# Patient Record
Sex: MALE | Race: WHITE | NOT HISPANIC OR LATINO | URBAN - METROPOLITAN AREA
[De-identification: names, ages, dates, MRNs, and addresses within clinical notes are randomized per-mention and may not be internally consistent; named-entity substitution may affect disease eponyms.]

---

## 2024-01-23 ENCOUNTER — OFFICE VISIT (OUTPATIENT)
Dept: PRIMARY CARE | Facility: CLINIC | Age: 29
End: 2024-01-23
Payer: COMMERCIAL

## 2024-01-23 VITALS
OXYGEN SATURATION: 98 % | BODY MASS INDEX: 35.75 KG/M2 | SYSTOLIC BLOOD PRESSURE: 130 MMHG | DIASTOLIC BLOOD PRESSURE: 88 MMHG | HEIGHT: 67 IN | HEART RATE: 114 BPM | TEMPERATURE: 97.3 F | WEIGHT: 227.8 LBS

## 2024-01-23 DIAGNOSIS — K21.9 GASTROESOPHAGEAL REFLUX DISEASE WITHOUT ESOPHAGITIS: ICD-10-CM

## 2024-01-23 DIAGNOSIS — J30.1 SEASONAL ALLERGIC RHINITIS DUE TO POLLEN: Primary | ICD-10-CM

## 2024-01-23 PROCEDURE — 1036F TOBACCO NON-USER: CPT | Performed by: FAMILY MEDICINE

## 2024-01-23 PROCEDURE — 96372 THER/PROPH/DIAG INJ SC/IM: CPT | Performed by: FAMILY MEDICINE

## 2024-01-23 PROCEDURE — 99203 OFFICE O/P NEW LOW 30 MIN: CPT | Performed by: FAMILY MEDICINE

## 2024-01-23 RX ORDER — METHYLPREDNISOLONE ACETATE 80 MG/ML
80 INJECTION, SUSPENSION INTRA-ARTICULAR; INTRALESIONAL; INTRAMUSCULAR; SOFT TISSUE ONCE
Status: COMPLETED | OUTPATIENT
Start: 2024-01-23 | End: 2024-01-23

## 2024-01-23 RX ADMIN — METHYLPREDNISOLONE ACETATE 80 MG: 80 INJECTION, SUSPENSION INTRA-ARTICULAR; INTRALESIONAL; INTRAMUSCULAR; SOFT TISSUE at 16:06

## 2024-01-23 ASSESSMENT — PATIENT HEALTH QUESTIONNAIRE - PHQ9
1. LITTLE INTEREST OR PLEASURE IN DOING THINGS: NOT AT ALL
SUM OF ALL RESPONSES TO PHQ9 QUESTIONS 1 & 2: 0
2. FEELING DOWN, DEPRESSED OR HOPELESS: NOT AT ALL

## 2024-01-23 ASSESSMENT — LIFESTYLE VARIABLES
HOW OFTEN DO YOU HAVE A DRINK CONTAINING ALCOHOL: NEVER
HOW OFTEN DO YOU HAVE SIX OR MORE DRINKS ON ONE OCCASION: NEVER

## 2024-01-23 ASSESSMENT — PAIN SCALES - GENERAL: PAINLEVEL: 2

## 2024-01-23 NOTE — PROGRESS NOTES
Subjective   Patient ID: Luis A Camacho is a 28 y.o. male who presents for Abdominal Pain (Patient is here for stomach issues and sinus issues. ).  HPI  28-year-old male presents to [facility] with new physician.  Needs a cortisone shot for allergies.  Also having acid reflux.  Review of Systems  Constitutional: no chills, no fever and no night sweats.   Eyes: no blurred vision and no eyesight problems.   ENT: the ears feel full, nasal congestion, postnasal drip and sinus pressure, but no tinnitus, no vertigo and no sore throat.   Cardiovascular: no chest pain, no intermittent leg claudication, no lower extremity edema, no palpitations and no syncope.   Respiratory: productive cough, no shortness of breath during exertion, no shortness of breath at rest and no wheezing.   Musculoskeletal: no arthralgias and no myalgias.   Neurological: no headache.   Hematologic/Lymphatic: no recurrent infections and no swollen glands.  Visit Vitals  /88 (BP Location: Left arm, Patient Position: Sitting, BP Cuff Size: Large adult)   Pulse (!) 114   Temp 36.3 °C (97.3 °F) (Temporal)        Objective   Physical Exam  Constitutional: Alert and in no acute distress. Well developed, well nourished.   Head and Face: Palpation of the face and sinuses: Abnormal.  Examination of the Sinuses: right maxillary tenderness, left maxillary tenderness, right frontal tenderness and left frontal tenderness.   Eyes: Pupils were equal in size, round, reactive to light (PERRL) with normal accommodation and extraocular movements intact (EOMI).   Ears, Nose, Mouth, and Throat: Otoscopic examination: Normal. Oropharynx: Abnormal.  Postnasal drainage.   Cardiovascular: Heart rate and rhythm were normal, normal S1 and S2, no gallops, no murmurs and no pericardial rub.   Pulmonary: No respiratory distress. Clear bilateral breath sounds.   Lymphatic: No cervical lymphadenopathy.  Assessment/Plan   Problem List Items Addressed This Visit              ICD-10-CM    Seasonal allergic rhinitis due to pollen - Primary J30.1    Relevant Medications    dexAMETHasone (Decadron) injection 4 mg    methylPREDNISolone acetate (DEPO-Medrol) injection 80 mg    Gastroesophageal reflux disease without esophagitis K21.9

## 2024-07-10 ENCOUNTER — TELEPHONE (OUTPATIENT)
Dept: PRIMARY CARE | Facility: CLINIC | Age: 29
End: 2024-07-10
Payer: COMMERCIAL

## 2024-07-10 DIAGNOSIS — J30.1 SEASONAL ALLERGIC RHINITIS DUE TO POLLEN: Primary | ICD-10-CM

## 2024-07-10 RX ORDER — METHYLPREDNISOLONE ACETATE 80 MG/ML
80 INJECTION, SUSPENSION INTRA-ARTICULAR; INTRALESIONAL; INTRAMUSCULAR; SOFT TISSUE ONCE
Status: COMPLETED | OUTPATIENT
Start: 2024-07-10 | End: 2024-07-11

## 2024-07-11 ENCOUNTER — CLINICAL SUPPORT (OUTPATIENT)
Dept: PRIMARY CARE | Facility: CLINIC | Age: 29
End: 2024-07-11
Payer: COMMERCIAL

## 2024-07-11 PROCEDURE — 96372 THER/PROPH/DIAG INJ SC/IM: CPT | Performed by: FAMILY MEDICINE

## 2024-10-01 ENCOUNTER — TELEPHONE (OUTPATIENT)
Dept: PRIMARY CARE | Facility: CLINIC | Age: 29
End: 2024-10-01

## 2024-10-01 ENCOUNTER — CLINICAL SUPPORT (OUTPATIENT)
Dept: PRIMARY CARE | Facility: CLINIC | Age: 29
End: 2024-10-01
Payer: COMMERCIAL

## 2024-10-01 DIAGNOSIS — J30.1 SEASONAL ALLERGIC RHINITIS DUE TO POLLEN: Primary | ICD-10-CM

## 2024-10-01 PROCEDURE — 96372 THER/PROPH/DIAG INJ SC/IM: CPT | Performed by: FAMILY MEDICINE

## 2024-10-01 RX ORDER — METHYLPREDNISOLONE ACETATE 80 MG/ML
80 INJECTION, SUSPENSION INTRA-ARTICULAR; INTRALESIONAL; INTRAMUSCULAR; SOFT TISSUE ONCE
Status: COMPLETED | OUTPATIENT
Start: 2024-10-01 | End: 2024-10-01

## 2024-11-18 ENCOUNTER — APPOINTMENT (OUTPATIENT)
Dept: RADIOLOGY | Facility: HOSPITAL | Age: 29
End: 2024-11-18
Payer: COMMERCIAL

## 2024-11-18 ENCOUNTER — HOSPITAL ENCOUNTER (EMERGENCY)
Facility: HOSPITAL | Age: 29
Discharge: HOME | End: 2024-11-18
Attending: STUDENT IN AN ORGANIZED HEALTH CARE EDUCATION/TRAINING PROGRAM
Payer: COMMERCIAL

## 2024-11-18 ENCOUNTER — APPOINTMENT (OUTPATIENT)
Dept: CARDIOLOGY | Facility: HOSPITAL | Age: 29
End: 2024-11-18
Payer: COMMERCIAL

## 2024-11-18 VITALS
RESPIRATION RATE: 20 BRPM | DIASTOLIC BLOOD PRESSURE: 101 MMHG | HEART RATE: 93 BPM | BODY MASS INDEX: 32.96 KG/M2 | SYSTOLIC BLOOD PRESSURE: 155 MMHG | HEIGHT: 67 IN | WEIGHT: 210 LBS | TEMPERATURE: 98.8 F | OXYGEN SATURATION: 97 %

## 2024-11-18 DIAGNOSIS — R00.0 SINUS TACHYCARDIA: Primary | ICD-10-CM

## 2024-11-18 DIAGNOSIS — R07.9 CHEST PAIN, UNSPECIFIED TYPE: ICD-10-CM

## 2024-11-18 LAB
ALBUMIN SERPL BCP-MCNC: 4.7 G/DL (ref 3.4–5)
ALP SERPL-CCNC: 62 U/L (ref 33–120)
ALT SERPL W P-5'-P-CCNC: 111 U/L (ref 10–52)
ANION GAP SERPL CALC-SCNC: 14 MMOL/L (ref 10–20)
AST SERPL W P-5'-P-CCNC: 41 U/L (ref 9–39)
BASOPHILS # BLD AUTO: 0.05 X10*3/UL (ref 0–0.1)
BASOPHILS NFR BLD AUTO: 0.5 %
BILIRUB SERPL-MCNC: 0.5 MG/DL (ref 0–1.2)
BNP SERPL-MCNC: 7 PG/ML (ref 0–99)
BUN SERPL-MCNC: 14 MG/DL (ref 6–23)
CALCIUM SERPL-MCNC: 10 MG/DL (ref 8.6–10.3)
CARDIAC TROPONIN I PNL SERPL HS: 3 NG/L (ref 0–20)
CARDIAC TROPONIN I PNL SERPL HS: 3 NG/L (ref 0–20)
CHLORIDE SERPL-SCNC: 104 MMOL/L (ref 98–107)
CO2 SERPL-SCNC: 24 MMOL/L (ref 21–32)
CREAT SERPL-MCNC: 0.87 MG/DL (ref 0.5–1.3)
D DIMER PPP FEU-MCNC: 254 NG/ML FEU
EGFRCR SERPLBLD CKD-EPI 2021: >90 ML/MIN/1.73M*2
EOSINOPHIL # BLD AUTO: 0.24 X10*3/UL (ref 0–0.7)
EOSINOPHIL NFR BLD AUTO: 2.6 %
ERYTHROCYTE [DISTWIDTH] IN BLOOD BY AUTOMATED COUNT: 13.1 % (ref 11.5–14.5)
GLUCOSE SERPL-MCNC: 102 MG/DL (ref 74–99)
HCT VFR BLD AUTO: 45.6 % (ref 41–52)
HGB BLD-MCNC: 15.6 G/DL (ref 13.5–17.5)
IMM GRANULOCYTES # BLD AUTO: 0.06 X10*3/UL (ref 0–0.7)
IMM GRANULOCYTES NFR BLD AUTO: 0.6 % (ref 0–0.9)
LYMPHOCYTES # BLD AUTO: 1.84 X10*3/UL (ref 1.2–4.8)
LYMPHOCYTES NFR BLD AUTO: 19.6 %
MAGNESIUM SERPL-MCNC: 1.9 MG/DL (ref 1.6–2.4)
MCH RBC QN AUTO: 29.6 PG (ref 26–34)
MCHC RBC AUTO-ENTMCNC: 34.2 G/DL (ref 32–36)
MCV RBC AUTO: 87 FL (ref 80–100)
MONOCYTES # BLD AUTO: 0.95 X10*3/UL (ref 0.1–1)
MONOCYTES NFR BLD AUTO: 10.1 %
NEUTROPHILS # BLD AUTO: 6.23 X10*3/UL (ref 1.2–7.7)
NEUTROPHILS NFR BLD AUTO: 66.6 %
NRBC BLD-RTO: 0 /100 WBCS (ref 0–0)
PLATELET # BLD AUTO: 344 X10*3/UL (ref 150–450)
POTASSIUM SERPL-SCNC: 3.8 MMOL/L (ref 3.5–5.3)
PROT SERPL-MCNC: 7.8 G/DL (ref 6.4–8.2)
RBC # BLD AUTO: 5.27 X10*6/UL (ref 4.5–5.9)
SODIUM SERPL-SCNC: 138 MMOL/L (ref 136–145)
TSH SERPL-ACNC: 1.88 MIU/L (ref 0.44–3.98)
WBC # BLD AUTO: 9.4 X10*3/UL (ref 4.4–11.3)

## 2024-11-18 PROCEDURE — 85025 COMPLETE CBC W/AUTO DIFF WBC: CPT | Performed by: NURSE PRACTITIONER

## 2024-11-18 PROCEDURE — 71046 X-RAY EXAM CHEST 2 VIEWS: CPT | Performed by: RADIOLOGY

## 2024-11-18 PROCEDURE — 36415 COLL VENOUS BLD VENIPUNCTURE: CPT | Performed by: NURSE PRACTITIONER

## 2024-11-18 PROCEDURE — 99283 EMERGENCY DEPT VISIT LOW MDM: CPT | Mod: 25

## 2024-11-18 PROCEDURE — 84443 ASSAY THYROID STIM HORMONE: CPT | Performed by: NURSE PRACTITIONER

## 2024-11-18 PROCEDURE — 71046 X-RAY EXAM CHEST 2 VIEWS: CPT

## 2024-11-18 PROCEDURE — 84484 ASSAY OF TROPONIN QUANT: CPT | Performed by: NURSE PRACTITIONER

## 2024-11-18 PROCEDURE — 2500000004 HC RX 250 GENERAL PHARMACY W/ HCPCS (ALT 636 FOR OP/ED): Performed by: STUDENT IN AN ORGANIZED HEALTH CARE EDUCATION/TRAINING PROGRAM

## 2024-11-18 PROCEDURE — 83735 ASSAY OF MAGNESIUM: CPT | Performed by: NURSE PRACTITIONER

## 2024-11-18 PROCEDURE — 84075 ASSAY ALKALINE PHOSPHATASE: CPT | Performed by: NURSE PRACTITIONER

## 2024-11-18 PROCEDURE — 96360 HYDRATION IV INFUSION INIT: CPT

## 2024-11-18 PROCEDURE — 83880 ASSAY OF NATRIURETIC PEPTIDE: CPT | Performed by: NURSE PRACTITIONER

## 2024-11-18 PROCEDURE — 85379 FIBRIN DEGRADATION QUANT: CPT | Performed by: NURSE PRACTITIONER

## 2024-11-18 PROCEDURE — 93005 ELECTROCARDIOGRAM TRACING: CPT

## 2024-11-18 RX ORDER — LORAZEPAM 2 MG/ML
1 INJECTION INTRAMUSCULAR ONCE
Status: DISCONTINUED | OUTPATIENT
Start: 2024-11-18 | End: 2024-11-18 | Stop reason: HOSPADM

## 2024-11-18 ASSESSMENT — PAIN DESCRIPTION - ORIENTATION: ORIENTATION: LEFT

## 2024-11-18 ASSESSMENT — COLUMBIA-SUICIDE SEVERITY RATING SCALE - C-SSRS
1. IN THE PAST MONTH, HAVE YOU WISHED YOU WERE DEAD OR WISHED YOU COULD GO TO SLEEP AND NOT WAKE UP?: NO
6. HAVE YOU EVER DONE ANYTHING, STARTED TO DO ANYTHING, OR PREPARED TO DO ANYTHING TO END YOUR LIFE?: NO
2. HAVE YOU ACTUALLY HAD ANY THOUGHTS OF KILLING YOURSELF?: NO

## 2024-11-18 ASSESSMENT — PAIN SCALES - GENERAL: PAINLEVEL_OUTOF10: 2

## 2024-11-18 ASSESSMENT — PAIN DESCRIPTION - LOCATION: LOCATION: ARM

## 2024-11-18 ASSESSMENT — PAIN DESCRIPTION - PAIN TYPE: TYPE: ACUTE PAIN

## 2024-11-18 ASSESSMENT — PAIN - FUNCTIONAL ASSESSMENT: PAIN_FUNCTIONAL_ASSESSMENT: 0-10

## 2024-11-18 ASSESSMENT — PAIN DESCRIPTION - DESCRIPTORS: DESCRIPTORS: SORE

## 2024-11-18 NOTE — ED TRIAGE NOTES
PT. STATES AWOKE AROUND 1 AM WITH PAIN TO LEFT ARM, STATES HAD SOME TINGLING. PT. STATES HAS BEEN GETTING THE SAME SENSATIONS OFF AND ON THROUGHOUT THE DAY. PT. STATES HAVING SOME SORENESS TO LEFT CHEST ALSO.

## 2024-11-18 NOTE — ED TRIAGE NOTES
Secondary to patient volumes and overcrowding, I performed a brief medical screening exam of the patient in triage, as the patient awaits space in the main ED.    History of Present Illness:  Luis A Camacho presents with   Chief Complaint   Patient presents with    Arm Pain     PT. STATES AWOKE AROUND 1 AM WITH PAIN TO LEFT ARM, STATES HAD SOME TINGLING. PT. STATES HAS BEEN GETTING THE SAME SENSATIONS OFF AND ON THROUGHOUT THE DAY. PT. STATES HAVING SOME SORENESS TO LEFT CHEST ALSO.   Patient also complaining of palpitations.    Physical Exam:  General - In no acute distress  Respiratory - Breathing comfortably  Cardiac -rapid rate, regular rhythm, normal S1, S2, no m/g/r.  Radial and pedal pulses 2+ bilaterally  Neurologic - Moving all extremities  Abdomen -bowel sounds present, no CVAT, nontender    Medical Decision Making:  Patient will require further evaluation in the main ED.    Initial diagnostic tests were ordered from triage.      The patient demonstrates understanding that this initial evaluation is a brief medical screening exam and the expectation is that they await for space in the main ED to be further evaluated.  The patient understands that, if they leave prior to further evaluation in the main ED after this initial evaluation in triage, they are doing so under their own accord knowing that their evaluation/work-up is not yet complete. The patient also understands that any preliminary diagnostic results, including abnormalities, may not be shared with them, if they choose to leave prior to further evaluation in the main ED.

## 2024-11-19 NOTE — DISCHARGE INSTRUCTIONS
You are diagnosed with sinus tachycardia as well as suspected panic attack.  I do recommend that you follow-up with the cardiologist in regards to the tachycardia.  Should you been experiencing new chest pain shortness of breath fevers symptoms concerning to call 911 or return to the nearest emergency department immediately.  Please follow-up with your primary physician in coming days to ensure improvement/resolution of symptoms.

## 2024-11-19 NOTE — ED PROVIDER NOTES
EMERGENCY DEPARTMENT ENCOUNTER      Pt Name: Luis A Camacho  MRN: [id number]  Birthdate 1995  Date of evaluation: 11/18/2024  Provider: Filiberto Wick DO    CHIEF COMPLAINT       Chief Complaint   Patient presents with    Arm Pain     PT. STATES AWOKE AROUND 1 AM WITH PAIN TO LEFT ARM, STATES HAD SOME TINGLING. PT. STATES HAS BEEN GETTING THE SAME SENSATIONS OFF AND ON THROUGHOUT THE DAY. PT. STATES HAVING SOME SORENESS TO LEFT CHEST ALSO.       HISTORY OF PRESENT ILLNESS    Luis A Camacho is a 29 y.o. male who presents to the emergency department with Family member for left chest/arm pain with some associated tingling which had started yesterday evening.  Patient denies any injury.  He notes that he is extremely anxious and does typically have panic attacks.  He states anytime that he thinks about it his heart does race.  He feels that this is likely anxiety related.  Wants to be sure that nothing else is going on.  He denies any recent flulike symptoms cough congestion or associated shortness of breath or sweats.  Currently the chest pain has resolved.  When present describes it as an aching sensation 3 out of 10.  Does not radiate to the back is not ripping or tearing.  No history of AAA.          Nursing Notes were reviewed.    REVIEW OF SYSTEMS     CONSTITUTIONAL: Denies fever, sweats, chills.   NEURO: Endorses tingling.  Denies difficulty walking, numbness, weakness, headache.   HEENT: Denies sore throat, rhinorrhea, changes in vision.   CARDIO: Endorses chest pain.  Denies palpitations.  PULM: Denies shortness of breath, cough.   GI: Denies abdominal pain, nausea, vomiting, diarrhea, constipation, melena, hematochezia.  : Denies painful urination, frequency, hematuria.   MSK: Denies recent trauma.   SKIN: Denies rash, lesions.   ENDOCRINE: Denies unexpected weight-loss.   HEME: Denies bleeding disorder.     PAST MEDICAL HISTORY     Past Medical History:   Diagnosis Date    Other conditions influencing  health status     No significant past medical history       SURGICAL HISTORY       Past Surgical History:   Procedure Laterality Date    NOSE SURGERY  05/11/2016    Nose Surgery       ALLERGIES     Penicillins    FAMILY HISTORY     No family history on file.     SOCIAL HISTORY       Social History     Socioeconomic History    Marital status: Single   Tobacco Use    Smoking status: Every Day     Current packs/day: 0.50     Types: Cigarettes    Smokeless tobacco: Never   Vaping Use    Vaping status: Every Day    Substances: Nicotine   Substance and Sexual Activity    Alcohol use: Yes     Comment: SOCIALLY    Drug use: Never       PHYSICAL EXAM   VS: As documented in the triage note from today's date and EMR flowsheet were reviewed.  Gen: Well developed. No acute distress. Seated in bed. Appears nontoxic.  Alert and oriented person time place.  Skin: Warm. Dry. Intact. No rashes or lesions.  Eyes: Pupils equally round and reactive to light. Clear sclera.  HENT: Atraumatic appearance. Mucosal membranes moist. No oral lesions, uvula midline, airway patent.   CV: Regular rate and regular rhythm. S1, S2. No pedal edema. Warm extremities.  Resp: Nonlabored breathing Clear to auscultation bilaterally. No increased work of breathing.   GI: Soft and nontender. No rebound or guarding. Bowel sounds x4 present.   MSK: Symmetric muscle bulk. No joint swelling in the extremities. Compartments are soft. Neurovascularly intact x4 extremities. Radial pulses +2 equal bilaterally.  Pedal pulses +2 equal bilaterally.  Neuro: Alert. Speech fluent. Moving all extremities. No focal deficits. Gait normal.  Psych: Appropriate. Kempt.    DIAGNOSTIC RESULTS   RADIOLOGY:   Non-plain film images such as CT, Ultrasound and MRI are read by the radiologist. Plain radiographic images are visualized and preliminarily interpreted by the emergency physician with the below findings: Chest x-ray is clear no evidence of widened mediastinum or  cardiomegaly.      Interpretation per the Radiologist below, if available at the time of this note:    XR chest 2 views   Final Result   No acute cardiopulmonary abnormality.        Signed by: Gab Daugherty 11/18/2024 5:34 PM   Dictation workstation:   [id number]            ED BEDSIDE ULTRASOUND:   Performed by ED Physician - none    LABS:  Labs Reviewed   COMPREHENSIVE METABOLIC PANEL - Abnormal       Result Value    Glucose 102 (*)     Sodium 138      Potassium 3.8      Chloride 104      Bicarbonate 24      Anion Gap 14      Urea Nitrogen 14      Creatinine 0.87      eGFR >90      Calcium 10.0      Albumin 4.7      Alkaline Phosphatase 62      Total Protein 7.8      AST 41 (*)     Bilirubin, Total 0.5       (*)    B-TYPE NATRIURETIC PEPTIDE - Normal    BNP 7      Narrative:        <100 pg/mL - Heart failure unlikely  100-299 pg/mL - Intermediate probability of acute heart                  failure exacerbation. Correlate with clinical                  context and patient history.    >=300 pg/mL - Heart Failure likely. Correlate with clinical                  context and patient history.    BNP testing is performed using different testing methodology at [facility] than at other [facility]. Direct result comparisons should only be made within the same method.      MAGNESIUM - Normal    Magnesium 1.90     D-DIMER, VTE EXCLUSION - Normal    D-Dimer, Quantitative VTE Exclusion 254      Narrative:     The VTE Exclusion D-Dimer assay is reported in ng/mL Fibrinogen Equivalent Units (FEU).    Per 's instructions for use, a value of less than 500 ng/mL (FEU) may help to exclude DVT or PE in outpatients when the assay is used with a clinical pretest probability assessment.(AEMR must utilize and document eCalc 'Wells Score Deep Vein Thrombosis Risk' for DVT exclusion only. Emergency Department should utilize  Guidelines for Emergency Department Use of the VTE Exclusion D-Dimer and  Clinical Pretest probability assessment model for DVT or PE exclusion.)   SERIAL TROPONIN-INITIAL - Normal    Troponin I, High Sensitivity 3      Narrative:     Less than 99th percentile of normal range cutoff-  Female and children under 18 years old <14 ng/L; Male <21 ng/L: Negative  Repeat testing should be performed if clinically indicated.     Female and children under 18 years old 14-50 ng/L; Male 21-50 ng/L:  Consistent with possible cardiac damage and possible increased clinical   risk. Serial measurements may help to assess extent of myocardial damage.     >50 ng/L: Consistent with cardiac damage, increased clinical risk and  myocardial infarction. Serial measurements may help assess extent of   myocardial damage.      NOTE: Children less than 1 year old may have higher baseline troponin   levels and results should be interpreted in conjunction with the overall   clinical context.     NOTE: Troponin I testing is performed using a different   testing methodology at [facility] than at other   [facility]. Direct result comparisons should only   be made within the same method.   TSH WITH REFLEX TO FREE T4 IF ABNORMAL - Normal    Thyroid Stimulating Hormone 1.88      Narrative:     TSH testing is performed using different testing methodology at [facility] than at other [facility]. Direct result comparisons should only be made within the same method.     SERIAL TROPONIN, 1 HOUR - Normal    Troponin I, High Sensitivity 3      Narrative:     Less than 99th percentile of normal range cutoff-  Female and children under 18 years old <14 ng/L; Male <21 ng/L: Negative  Repeat testing should be performed if clinically indicated.     Female and children under 18 years old 14-50 ng/L; Male 21-50 ng/L:  Consistent with possible cardiac damage and possible increased clinical   risk. Serial measurements may help to assess extent of myocardial damage.     >50 ng/L: Consistent with cardiac  damage, increased clinical risk and  myocardial infarction. Serial measurements may help assess extent of   myocardial damage.      NOTE: Children less than 1 year old may have higher baseline troponin   levels and results should be interpreted in conjunction with the overall   clinical context.     NOTE: Troponin I testing is performed using a different   testing methodology at [facility] than at other   [facility] hospitals. Direct result comparisons should only   be made within the same method.   CBC WITH AUTO DIFFERENTIAL    WBC 9.4      nRBC 0.0      RBC 5.27      Hemoglobin 15.6      Hematocrit 45.6      MCV 87      MCH 29.6      MCHC 34.2      RDW 13.1      Platelets 344      Neutrophils % 66.6      Immature Granulocytes %, Automated 0.6      Lymphocytes % 19.6      Monocytes % 10.1      Eosinophils % 2.6      Basophils % 0.5      Neutrophils Absolute 6.23      Immature Granulocytes Absolute, Automated 0.06      Lymphocytes Absolute 1.84      Monocytes Absolute 0.95      Eosinophils Absolute 0.24      Basophils Absolute 0.05     TROPONIN SERIES- (INITIAL, 1 HR)    Narrative:     The following orders were created for panel order Troponin I Series, High Sensitivity (0, 1 HR).  Procedure                               Abnormality         Status                     ---------                               -----------         ------                     Troponin I, High Sensitiv...[[id number]]  Normal              Final result               Troponin, High Sensitivit...[[id number]]  Normal              Final result                 Please view results for these tests on the individual orders.       All other labs were within normal range or not returned as of this dictation.    EMERGENCY DEPARTMENT COURSE/MDM:   Vitals:    Vitals:    11/18/24 2045 11/18/24 2100 11/18/24 2115 11/18/24 2120   BP:  (!) 186/85  (!) 155/101   BP Location:       Patient Position:       Pulse: 97 (!) 115 93    Resp: (!) 22 (!) 21 20     Temp:       TempSrc:       SpO2: 95% 98% 97%    Weight:       Height:           I reviewed the patient's triage vitals and they are hypertensive recommended follow-up with primary physician for repeat checks.  Patient initially arrives tachycardic this did downtrend significantly without intervention.    Due to the above findings the following was ordered cardiac workup to include D-dimer.    Patient reports that that he feels this is anxiety related he was offered Ativan although declined as he states that he needs to be able to drive home.  Fluid bolus was given.    Workup was pursued cardiac troponin x 2 within normal range no acute injury pattern on EKG sinus tachycardia alone.  D-dimer within normal range making PE less likely.  LFTs chronically elevated no right upper quadrant tenderness recommended close outpatient follow-up.  Renal function is appropriate no significant electrolyte derangements TSH within normal range.  No signs of anemia.  Chest x-ray is clear.  Patient was reevaluated after fluid bolus tachycardia has resolved.  He is recommended close follow-up with cardiology as well as primary physician and strict return precautions could be related to anxiety/panic although unclear at this time heart score is low.  No runs of dysrhythmias at this time.  Patient is appreciative care agreeable with plan discharged in stable condition.    HEART Score:    History:   [x  ] Slightly suspicious - 0   [  ] Moderately suspicious - 1  [  ] Highly suspicious - 2     EKG:   [ x ] Normal - 0    [  ] Non-specific repolarization disturbance (No ST changes, but LBBB, LVH, repolarization changes)  - 1   [  ] Significant ST deviation (ST changes not d/t LBBB, LVH, digoxin)  - 2     Age:   [ x ] < 45 - 0   [  ] 45-64 - 1   [  ] > 65 - 2     Risk Factors:     HTN, HLD, DM, obesity (BMI > 30), smoking (current or w/I 3mo), + fmx (before age of 65), CAD, prior MI, PCI/CABG, CVA/TIA, PAD  [  ] No known risk factors - 0   [  x ] 1-2 risk factors - 1    [  ] >3 risk factors or hx of atherosclerotic disease - 2     Initial troponin:  [x  ] < normal limit - 0   [  ] 1 - 3x normal limit - 1   [  ] > 3x normal limit - 2    Total:   [x ] 0-3 Points: 1.7% risk of major adverse cardiac event in 6 weeks  [ ] 4-6 Points: 12-16.6% risk of major adverse cardiac event in 6 weeks  [ ] 7-10 Points 50-65% risk of major adverse cardiac event in 6 weeks    I did discuss the heart score results with the patient.  We did discuss the risk stratification. I did discuss that the patient's risk based on the above scoring and their risk of coronary artery disease. The patient is comfortable being discharged home and following up with the appropriate physicians. This is shared decision making. They're to return to the nearest emergency room for any new or worsening symptoms.    ED Course as of 11/18/24 2245 Mon Nov 18, 2024   1910 Interpreted by the Emergency Department Attending: ECG revealed sinus tachycardia at a rate of 128 beats per minute with FL interval 145 , QRS of 79 , QTc of 423.  No acute injury pattern. Previous EKG on March 22 revealed no significant changes.    [MG]      ED Course User Index  [MG] Filiberto Wick DO         Diagnoses as of 11/18/24 2245   Sinus tachycardia   Chest pain, unspecified type       Patient was counseled regarding labs, imaging, likely diagnosis, and plan. All questions were answered.     ------------------------------------------------------------------  Information provided by the patient and family member  Past medical history complicating workup panic attacks, anxiety  Previous medical records reviewed office visit 1/23/2024  Considered CTA although D-dimer within normal range.  Clinically low concern for aortic pathology.  Shared medical decision making patient agreeable with close outpatient follow-up prefers to hold off on Ativan due to  driving.  ------------------------------------------------------------------  ED Medications administered this visit:    Medications   LORazepam (Ativan) injection 1 mg (1 mg intravenous Not Given 11/18/24 1949)   lactated Ringer's bolus 1,000 mL (0 mL intravenous Stopped 11/18/24 2117)       New Prescriptions from this visit:  There are no discharge medications for this patient.      Follow-up:  Delano RAMIREZ DO  [address]  [address]  [facility] [id number] [phone]    Schedule an appointment as soon as possible for a visit in 2 days      [facility]  [address] [facility] [id number] [phone]  Go to   If symptoms worsen    James C Ramicone, DO  [address]  [facility] [id number] [phone]    Schedule an appointment as soon as possible for a visit in 1 day          Final Impression:   1. Sinus tachycardia    2. Chest pain, unspecified type          Filiberto Wick DO    (Please note that portions of this note were completed with a voice recognition program.  Efforts were made to edit the dictations but occasionally words are mis-transcribed.)     Filiberto Wick DO  11/18/24 2258

## 2024-11-22 LAB
ATRIAL RATE: 129 BPM
P AXIS: 51 DEGREES
PR INTERVAL: 145 MS
Q ONSET: 253 MS
QRS COUNT: 21 BEATS
QRS DURATION: 79 MS
QT INTERVAL: 290 MS
QTC CALCULATION(BAZETT): 423 MS
QTC FREDERICIA: 373 MS
R AXIS: -24 DEGREES
T AXIS: 21 DEGREES
T OFFSET: 398 MS
VENTRICULAR RATE: 128 BPM

## 2025-02-11 ENCOUNTER — APPOINTMENT (OUTPATIENT)
Dept: PRIMARY CARE | Facility: CLINIC | Age: 30
End: 2025-02-11
Payer: COMMERCIAL

## 2025-02-11 VITALS
HEART RATE: 109 BPM | BODY MASS INDEX: 34.37 KG/M2 | DIASTOLIC BLOOD PRESSURE: 84 MMHG | OXYGEN SATURATION: 98 % | HEIGHT: 67 IN | WEIGHT: 219 LBS | SYSTOLIC BLOOD PRESSURE: 138 MMHG | TEMPERATURE: 97.3 F

## 2025-02-11 DIAGNOSIS — Z72.89 CURRENT EVERY DAY VAPING: ICD-10-CM

## 2025-02-11 DIAGNOSIS — F41.9 ANXIETY: Primary | ICD-10-CM

## 2025-02-11 PROCEDURE — 3008F BODY MASS INDEX DOCD: CPT | Performed by: STUDENT IN AN ORGANIZED HEALTH CARE EDUCATION/TRAINING PROGRAM

## 2025-02-11 PROCEDURE — 99204 OFFICE O/P NEW MOD 45 MIN: CPT | Performed by: STUDENT IN AN ORGANIZED HEALTH CARE EDUCATION/TRAINING PROGRAM

## 2025-02-11 RX ORDER — SERTRALINE HYDROCHLORIDE 25 MG/1
25 TABLET, FILM COATED ORAL DAILY
Qty: 30 TABLET | Refills: 2 | Status: SHIPPED | OUTPATIENT
Start: 2025-02-11 | End: 2025-05-12

## 2025-02-11 RX ORDER — HYDROXYZINE HYDROCHLORIDE 25 MG/1
25 TABLET, FILM COATED ORAL NIGHTLY PRN
Qty: 90 TABLET | Refills: 1 | Status: SHIPPED | OUTPATIENT
Start: 2025-02-11 | End: 2026-02-11

## 2025-02-11 RX ORDER — HYDROXYZINE HYDROCHLORIDE 25 MG/1
TABLET, FILM COATED ORAL
COMMUNITY
Start: 2025-01-02 | End: 2025-02-11 | Stop reason: SDUPTHER

## 2025-02-11 ASSESSMENT — PAIN SCALES - GENERAL: PAINLEVEL_OUTOF10: 0-NO PAIN

## 2025-02-11 ASSESSMENT — PATIENT HEALTH QUESTIONNAIRE - PHQ9
1. LITTLE INTEREST OR PLEASURE IN DOING THINGS: NOT AT ALL
2. FEELING DOWN, DEPRESSED OR HOPELESS: NOT AT ALL
SUM OF ALL RESPONSES TO PHQ9 QUESTIONS 1 AND 2: 0

## 2025-02-11 ASSESSMENT — ENCOUNTER SYMPTOMS
LOSS OF SENSATION IN FEET: 0
DEPRESSION: 0
OCCASIONAL FEELINGS OF UNSTEADINESS: 0

## 2025-02-11 NOTE — PROGRESS NOTES
Subjective   Patient ID: Luis A Camacho is a 29 y.o. male who presents for New Patient Visit.  HPI    Luis A is new patient to the office. His main complaint today is increasing anxiety. He states anxiety is something he has always dealt with but things have heightened after his mother had heart attack a few months ago. He states after the incident, he was having 5-7 anxiety attacks a day. Things have since improved, but often feels his heart racing and pain in chest and stomach, no nausea or vomiting. He has hydroxyzine he takes nightly to help him sleep. He is interested in trying an anxiety medication today. No depression, No SI/HI.    Patient quit drinking 4-5 months ago, previously had weekend binges. He was a previous cigarette smoker, but quit 9 years ago. He now vapes nicotine. A vape lasts him about a day and a half.     PMHx: anxiety, seasonal allergies  SurgHx: nose reconstruction surgery  FamHx: heart disease (mom had heart attack), maternal uncle and grandfather (heart disease)  SocialHx: Works with [employer]. Eats out a lot due to long work hours. Vapes nicotine, quit cigarettes 9 years ago. No EtOH in 4 months. Lives at home with mother & takes care of her. Not sexually active.     Review of Systems  12-point ROS was reviewed and is negative, unless otherwise noted in HPI    Objective   Vitals:    02/11/25 1057   BP: 138/84   Pulse: 109   Temp: 36.3 °C (97.3 °F)   SpO2: 98%      Physical Exam  GEN: alert, conversant, NAD  HEENT: PERRL, EOMI, MMM, Tms pearly gray bilaterally  NECK: supple, no LAD appreciated  CHEST: CTAB  CV: S1, S2, RRR, no murmurs appreciated  ABD: soft, NT, ND  EXT: no significant LE edema  SKIN: warm, dry    Assessment/Plan   #Anxiety  - Begin sertraline 25 mg tablet daily, side effect profile discussed  - Continue hydroxyzine PRN for insomnia and anxiety   - Follow up in about 1 month to see how things are progressing  - Recommended therapy, not interested at this time    #Smoker,  vaping  - Counseled on the health risks associated with vaping and benefits of quitting   - Pt said he has quit in the past and feels as if he could do it again once anxiety gets under control      Health Maintenance:  Vaccines: COVID (declines), Flu (declines), TDAP (2020)  Labs: Repeat CMP and Lipid panel at next visit     RTC in 1 month, or sooner AVIVA PETERSON MS-3    Trainee role: Medical Student    I saw and evaluated the patient. I personally obtained the key and critical portions of the history and physical exam or was physically present for key and critical portions performed by the trainee. I reviewed the trainee's documentation and discussed the patient with the trainee. I agree with the trainee's medical decision making as documented on the trainee's notes.    Mikhail Jha, DO

## 2025-03-25 ENCOUNTER — APPOINTMENT (OUTPATIENT)
Dept: PRIMARY CARE | Facility: CLINIC | Age: 30
End: 2025-03-25
Payer: COMMERCIAL

## 2025-03-25 VITALS
BODY MASS INDEX: 33.71 KG/M2 | OXYGEN SATURATION: 98 % | DIASTOLIC BLOOD PRESSURE: 80 MMHG | HEART RATE: 121 BPM | SYSTOLIC BLOOD PRESSURE: 132 MMHG | TEMPERATURE: 97.8 F | WEIGHT: 215.2 LBS

## 2025-03-25 DIAGNOSIS — F41.9 ANXIETY: Primary | ICD-10-CM

## 2025-03-25 PROCEDURE — 99213 OFFICE O/P EST LOW 20 MIN: CPT | Performed by: STUDENT IN AN ORGANIZED HEALTH CARE EDUCATION/TRAINING PROGRAM

## 2025-03-25 RX ORDER — SERTRALINE HYDROCHLORIDE 25 MG/1
25 TABLET, FILM COATED ORAL DAILY
Qty: 90 TABLET | Refills: 1 | Status: SHIPPED | OUTPATIENT
Start: 2025-03-25 | End: 2025-09-21

## 2025-03-25 ASSESSMENT — PATIENT HEALTH QUESTIONNAIRE - PHQ9
1. LITTLE INTEREST OR PLEASURE IN DOING THINGS: NOT AT ALL
SUM OF ALL RESPONSES TO PHQ9 QUESTIONS 1 AND 2: 0
2. FEELING DOWN, DEPRESSED OR HOPELESS: NOT AT ALL

## 2025-03-25 ASSESSMENT — ENCOUNTER SYMPTOMS: DEPRESSION: 0

## 2025-03-25 ASSESSMENT — PAIN SCALES - GENERAL: PAINLEVEL_OUTOF10: 0-NO PAIN

## 2025-03-25 NOTE — PROGRESS NOTES
Subjective   Patient ID: Luis A Camacho is a 30 y.o. male who presents for Follow-up.  HPI  Luis A is here for follow up visit.    He has been doing very well since starting sertraline. Does have some nausea after taking it. No other side effects. Feels much improved overall. No panic attacks since starting. No depressive symptoms. He has been helping his mother recover after her heart surgery.     Taking hydoxyzine ~1/2 nights to help with sleep. Sleep is much improved.    Review of Systems  12-point ROS was reviewed and is negative, unless otherwise noted in HPI    Objective   Vitals:    03/25/25 1419   BP: 132/80   Pulse: (!) 121   Temp: 36.6 °C (97.8 °F)   SpO2: 98%      Physical Exam  GEN: alert, conversant, NAD  HEENT: PERRL, EOMI, MMM, Tms pearly gray bilaterally  NECK: supple, no LAD appreciated  CHEST: CTAB  CV: S1, S2, RRR, no murmurs appreciated  ABD: soft, NT, ND  EXT: no significant LE edema  SKIN: warm, dry    Assessment/Plan   #Anxiety  - Continue sertraline 25 mg tablet daily  - Continue hydroxyzine PRN for insomnia and anxiety     #Smoker, vaping  - Counseled on the health risks associated with vaping and benefits of quitting. Vaping nicotine occasionally, cutting down.  - Pt said he has quit in the past and feels as if he could do it again once anxiety gets under control      Health Maintenance:  Vaccines: COVID (declines), Flu (declines), TDAP (2020)  Screening: N/A  Labs: Consider labs at next visit     RTC in 2-3 month, or sooner PRN    Mikhail Jah, DO

## 2025-04-21 ENCOUNTER — HOSPITAL ENCOUNTER (EMERGENCY)
Facility: HOSPITAL | Age: 30
Discharge: HOME | End: 2025-04-22
Attending: STUDENT IN AN ORGANIZED HEALTH CARE EDUCATION/TRAINING PROGRAM
Payer: COMMERCIAL

## 2025-04-21 DIAGNOSIS — S61.213A LACERATION OF LEFT MIDDLE FINGER WITHOUT FOREIGN BODY WITHOUT DAMAGE TO NAIL, INITIAL ENCOUNTER: ICD-10-CM

## 2025-04-21 DIAGNOSIS — S01.81XA FACIAL LACERATION, INITIAL ENCOUNTER: Primary | ICD-10-CM

## 2025-04-21 PROCEDURE — 12041 INTMD RPR N-HF/GENIT 2.5CM/<: CPT

## 2025-04-21 PROCEDURE — 99283 EMERGENCY DEPT VISIT LOW MDM: CPT | Performed by: STUDENT IN AN ORGANIZED HEALTH CARE EDUCATION/TRAINING PROGRAM

## 2025-04-22 VITALS
HEIGHT: 67 IN | HEART RATE: 86 BPM | WEIGHT: 212.5 LBS | BODY MASS INDEX: 33.35 KG/M2 | TEMPERATURE: 97.2 F | OXYGEN SATURATION: 99 % | RESPIRATION RATE: 18 BRPM | SYSTOLIC BLOOD PRESSURE: 166 MMHG | DIASTOLIC BLOOD PRESSURE: 84 MMHG

## 2025-04-22 PROCEDURE — 90471 IMMUNIZATION ADMIN: CPT | Performed by: STUDENT IN AN ORGANIZED HEALTH CARE EDUCATION/TRAINING PROGRAM

## 2025-04-22 PROCEDURE — 2500000005 HC RX 250 GENERAL PHARMACY W/O HCPCS: Performed by: STUDENT IN AN ORGANIZED HEALTH CARE EDUCATION/TRAINING PROGRAM

## 2025-04-22 PROCEDURE — 12053 INTMD RPR FACE/MM 5.1-7.5 CM: CPT | Performed by: NURSE PRACTITIONER

## 2025-04-22 PROCEDURE — 90715 TDAP VACCINE 7 YRS/> IM: CPT | Mod: JZ | Performed by: STUDENT IN AN ORGANIZED HEALTH CARE EDUCATION/TRAINING PROGRAM

## 2025-04-22 PROCEDURE — 2500000004 HC RX 250 GENERAL PHARMACY W/ HCPCS (ALT 636 FOR OP/ED): Mod: JZ | Performed by: STUDENT IN AN ORGANIZED HEALTH CARE EDUCATION/TRAINING PROGRAM

## 2025-04-22 RX ORDER — ACETAMINOPHEN 325 MG/1
650 TABLET ORAL ONCE
Status: DISCONTINUED | OUTPATIENT
Start: 2025-04-22 | End: 2025-04-22 | Stop reason: HOSPADM

## 2025-04-22 RX ORDER — BACITRACIN ZINC 500 UNIT/G
OINTMENT IN PACKET (EA) TOPICAL ONCE
Status: COMPLETED | OUTPATIENT
Start: 2025-04-22 | End: 2025-04-22

## 2025-04-22 RX ADMIN — BACITRACIN ZINC 1 APPLICATION: 500 OINTMENT TOPICAL at 03:32

## 2025-04-22 RX ADMIN — TETANUS TOXOID, REDUCED DIPHTHERIA TOXOID AND ACELLULAR PERTUSSIS VACCINE, ADSORBED 0.5 ML: 5; 2.5; 8; 8; 2.5 SUSPENSION INTRAMUSCULAR at 01:23

## 2025-04-22 ASSESSMENT — LIFESTYLE VARIABLES
HAVE PEOPLE ANNOYED YOU BY CRITICIZING YOUR DRINKING: NO
HAVE YOU EVER FELT YOU SHOULD CUT DOWN ON YOUR DRINKING: NO
TOTAL SCORE: 0
EVER HAD A DRINK FIRST THING IN THE MORNING TO STEADY YOUR NERVES TO GET RID OF A HANGOVER: NO
EVER FELT BAD OR GUILTY ABOUT YOUR DRINKING: NO

## 2025-04-22 ASSESSMENT — COLUMBIA-SUICIDE SEVERITY RATING SCALE - C-SSRS
6. HAVE YOU EVER DONE ANYTHING, STARTED TO DO ANYTHING, OR PREPARED TO DO ANYTHING TO END YOUR LIFE?: NO
2. HAVE YOU ACTUALLY HAD ANY THOUGHTS OF KILLING YOURSELF?: NO
1. IN THE PAST MONTH, HAVE YOU WISHED YOU WERE DEAD OR WISHED YOU COULD GO TO SLEEP AND NOT WAKE UP?: NO

## 2025-04-22 NOTE — DISCHARGE INSTRUCTIONS
As discussed you can apply topical bacitracin up to 3 times daily please keep the wound clean and dry.  No submerging the wound.  Please follow-up with your primary physician in the coming days for wound reevaluation.  Have the sutures removed in 5 to 7 days.  Should you been experiencing signs of infection but not limited to including confusion numbness tingling weakness symptoms concerning to call 911 or return to the nearest emergency department immediately.
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
## 2025-04-22 NOTE — ED PROCEDURE NOTE
Procedure  Laceration Repair    Performed by: SPEEDY Pineda  Authorized by: SPEEDY Pineda    Consent:     Consent obtained:  Verbal    Consent given by:  Patient    Risks, benefits, and alternatives were discussed: yes      Risks discussed:  Infection, need for additional repair, nerve damage, poor wound healing, poor cosmetic result, pain, retained foreign body, tendon damage and vascular damage    Alternatives discussed:  Referral, observation, delayed treatment and no treatment  Universal protocol:     Procedure explained and questions answered to patient or proxy's satisfaction: yes      Relevant documents present and verified: yes      Test results available: yes      Imaging studies available: yes      Required blood products, implants, devices, and special equipment available: yes      Immediately prior to procedure, a time out was called: yes      Patient identity confirmed:  Verbally with patient and arm band  Anesthesia:     Anesthesia method:  Local infiltration    Local anesthetic:  Lidocaine 1% WITH epi  Laceration details:     Location:  Face    Face location:  L eyebrow    Length (cm):  7.5    Depth (mm):  5  Pre-procedure details:     Preparation:  Patient was prepped and draped in usual sterile fashion  Exploration:     Limited defect created (wound extended): no      Imaging outcome: foreign body not noted      Wound exploration: wound explored through full range of motion and entire depth of wound visualized      Wound extent: no fascia violation noted, no foreign bodies/material noted, no muscle damage noted, no nerve damage noted, no tendon damage noted, no underlying fracture noted and no vascular damage noted    Treatment:     Area cleansed with:  Soap and water    Amount of cleaning:  Standard    Debridement:  None    Undermining:  None    Scar revision: no    Skin repair:     Repair method:  Sutures    Suture size:  6-0    Suture material:  Nylon    Suture technique:   Simple interrupted    Number of sutures:  20  Approximation:     Approximation:  Close  Repair type:     Repair type:  Intermediate  Post-procedure details:     Dressing:  Antibiotic ointment    Procedure completion:  Tolerated  Laceration Repair    Performed by: SPEEDY Pineda  Authorized by: SPEEDY Pineda    Consent:     Consent obtained:  Verbal    Consent given by:  Patient    Risks, benefits, and alternatives were discussed: yes      Risks discussed:  Infection, need for additional repair, nerve damage, poor wound healing, poor cosmetic result, pain, retained foreign body, tendon damage and vascular damage    Alternatives discussed:  Referral, delayed treatment, no treatment and observation  Universal protocol:     Procedure explained and questions answered to patient or proxy's satisfaction: yes      Relevant documents present and verified: yes      Test results available: yes      Imaging studies available: yes      Required blood products, implants, devices, and special equipment available: yes      Immediately prior to procedure, a time out was called: yes      Patient identity confirmed:  Verbally with patient and arm band  Anesthesia:     Anesthesia method:  Local infiltration    Local anesthetic:  Lidocaine 1% w/o epi  Laceration details:     Location:  Finger    Finger location:  L long finger    Wound length (cm): 1.5 cm x 2 cm V shaped laceration.    Depth (mm):  2  Pre-procedure details:     Preparation:  Patient was prepped and draped in usual sterile fashion  Exploration:     Limited defect created (wound extended): no      Imaging outcome: foreign body not noted      Wound exploration: wound explored through full range of motion and entire depth of wound visualized      Wound extent: no fascia violation noted, no foreign bodies/material noted, no muscle damage noted, no nerve damage noted, no tendon damage noted, no underlying fracture noted and no vascular damage noted       Contaminated: no    Treatment:     Area cleansed with:  Soap and water    Amount of cleaning:  Standard    Debridement:  None    Undermining:  None    Scar revision: no    Skin repair:     Repair method:  Sutures    Suture size:  4-0    Suture material:  Nylon    Suture technique:  Simple interrupted    Number of sutures: 6 stitches total, 5 interrupted and 1 corner stitch.  Approximation:     Approximation:  Close  Repair type:     Repair type:  Intermediate  Post-procedure details:     Dressing:  Antibiotic ointment and sterile dressing    Procedure completion:  Tolerated               KENNEDY Pineda-MISTY  04/22/25 3079

## 2025-04-28 ENCOUNTER — HOSPITAL ENCOUNTER (EMERGENCY)
Facility: HOSPITAL | Age: 30
Discharge: HOME | End: 2025-04-28
Attending: STUDENT IN AN ORGANIZED HEALTH CARE EDUCATION/TRAINING PROGRAM
Payer: COMMERCIAL

## 2025-04-28 VITALS
RESPIRATION RATE: 16 BRPM | HEART RATE: 99 BPM | DIASTOLIC BLOOD PRESSURE: 78 MMHG | SYSTOLIC BLOOD PRESSURE: 152 MMHG | BODY MASS INDEX: 33.27 KG/M2 | WEIGHT: 212 LBS | TEMPERATURE: 97.3 F | OXYGEN SATURATION: 98 % | HEIGHT: 67 IN

## 2025-04-28 DIAGNOSIS — Z48.02 ENCOUNTER FOR REMOVAL OF SUTURES: Primary | ICD-10-CM

## 2025-04-28 PROCEDURE — 99281 EMR DPT VST MAYX REQ PHY/QHP: CPT | Performed by: STUDENT IN AN ORGANIZED HEALTH CARE EDUCATION/TRAINING PROGRAM

## 2025-04-28 ASSESSMENT — PAIN - FUNCTIONAL ASSESSMENT: PAIN_FUNCTIONAL_ASSESSMENT: 0-10

## 2025-04-28 ASSESSMENT — PAIN SCALES - GENERAL: PAINLEVEL_OUTOF10: 0 - NO PAIN

## 2025-04-28 NOTE — Clinical Note
Luis A Camacho was seen and treated in our emergency department on 4/28/2025.  He may return to work on 04/29/2025.       If you have any questions or concerns, please don't hesitate to call.      Filiberto Wick, DO

## 2025-04-28 NOTE — DISCHARGE INSTRUCTIONS
Patient presents to urgent care or your primary care physician in 3 days for further suture removal.  You can apply bacitracin ointment to the area and cleanse twice daily with antibacterial soap and water.  Recommend silicone ointment or adhesive bandages for scar prevention.  Return to the ED with any new or worsening symptoms including worsening pain, severe swelling, heavy bleeding, or subsequent injury.
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
## 2025-05-02 ENCOUNTER — HOSPITAL ENCOUNTER (EMERGENCY)
Facility: HOSPITAL | Age: 30
Discharge: HOME | End: 2025-05-02
Payer: COMMERCIAL

## 2025-05-02 VITALS
SYSTOLIC BLOOD PRESSURE: 165 MMHG | TEMPERATURE: 96.8 F | BODY MASS INDEX: 32.96 KG/M2 | HEIGHT: 67 IN | RESPIRATION RATE: 16 BRPM | OXYGEN SATURATION: 100 % | DIASTOLIC BLOOD PRESSURE: 84 MMHG | HEART RATE: 90 BPM | WEIGHT: 210 LBS

## 2025-05-02 DIAGNOSIS — Z48.02 VISIT FOR SUTURE REMOVAL: Primary | ICD-10-CM

## 2025-05-02 PROCEDURE — 99281 EMR DPT VST MAYX REQ PHY/QHP: CPT

## 2025-05-02 NOTE — DISCHARGE INSTRUCTIONS
You can use the Steri-Strips to help keep the wound together.  You were also referred to plastics on-call for follow-up appointment if you have any issues with your scar and wound healing.  Return to ER if symptoms change or worsen

## 2025-05-02 NOTE — ED PROVIDER NOTES
"Limitations to History: none  External Records Reviewed  Independent Historians: self  Social determinants affecting care: none    HPI  Luis A Camacho is a 30 y.o. male who presents emergency department for suture removal.  He reports that he was here few days ago for suture removal however no left for sutures and as the wound appeared to be dehiscing.  He has not had any drainage or pain to the area.  He reports that it is a little bit itchy.  He has not had fever or chills.  He has no further complaints    [facility]  Medical History[1] reviewed by myself.    Meds  Current Outpatient Medications   Medication Instructions    hydrOXYzine HCL (ATARAX) 25 mg, oral, Nightly PRN    sertraline (ZOLOFT) 25 mg, oral, Daily       Allergies  RX Allergies[2] reviewed by myself.    SHx  Social History[3] reviewed by myself.      ------------------------------------------------------------------------------------------------------------------------------------------    /84 (BP Location: Right arm, Patient Position: Sitting)   Pulse 90   Temp 36 °C (96.8 °F) (Tympanic)   Resp 16   Ht 1.702 m (5' 7\")   Wt 95.3 kg (210 lb)   SpO2 100%   BMI 32.89 kg/m²     Physical Exam  Vitals and nursing note reviewed.   Constitutional:       Appearance: Normal appearance. He is normal weight.   HENT:      Head: Atraumatic.      Nose: Nose normal.      Mouth/Throat:      Mouth: Mucous membranes are moist.   Eyes:      Extraocular Movements: Extraocular movements intact.      Conjunctiva/sclera: Conjunctivae normal.   Cardiovascular:      Rate and Rhythm: Normal rate.   Pulmonary:      Effort: Pulmonary effort is normal.   Musculoskeletal:         General: Normal range of motion.      Cervical back: Neck supple.   Skin:     General: Skin is warm and dry.      Comments: 4 sutures intact to the left eyelid. No erythema or drainage.    Neurological:      Mental Status: He is alert and oriented to person, place, and time.   Psychiatric:         " Mood and Affect: Mood normal.          ------------------------------------------------------------------------------------------------------------------------------------------  Labs  Labs Reviewed - No data to display     Imaging  No orders to display        ED Course  Diagnoses as of 05/02/25 1110   Visit for suture removal        Medical Decision Making: He did not appear ill or toxic.  Vital signs reviewed.  He is hypertensive but otherwise hemodynamically stable.  4 sutures intact.  These were easily removed. Steri-Strip placed over the wounds.  He was referred to plastics on call if he has any concerns about the scarring or wound healing.  He is to return to ER if his symptoms change or worsen.  He verbalized understanding and agreed to plan of care. He was discharged home in stable condition.    Diagnosis: Encounter for suture removal  Plan: Discharge       [1]   Past Medical History:  Diagnosis Date    Other conditions influencing health status     No significant past medical history   [2]   Allergies  Allergen Reactions    Penicillins Unknown   [3]   Social History  Tobacco Use    Smoking status: Former     Types: Cigarettes    Smokeless tobacco: Never   Vaping Use    Vaping status: Every Day    Substances: Nicotine   Substance Use Topics    Alcohol use: Yes     Comment: SOCIALLY    Drug use: Never        Parminder Verde PA-C  05/02/25 1110

## 2025-07-12 ENCOUNTER — APPOINTMENT (OUTPATIENT)
Dept: CARDIOLOGY | Facility: HOSPITAL | Age: 30
End: 2025-07-12
Payer: COMMERCIAL

## 2025-07-12 ENCOUNTER — HOSPITAL ENCOUNTER (EMERGENCY)
Facility: HOSPITAL | Age: 30
Discharge: HOME | End: 2025-07-12
Attending: STUDENT IN AN ORGANIZED HEALTH CARE EDUCATION/TRAINING PROGRAM
Payer: COMMERCIAL

## 2025-07-12 VITALS
TEMPERATURE: 98.6 F | HEIGHT: 67 IN | BODY MASS INDEX: 33.74 KG/M2 | RESPIRATION RATE: 18 BRPM | OXYGEN SATURATION: 98 % | SYSTOLIC BLOOD PRESSURE: 155 MMHG | HEART RATE: 111 BPM | WEIGHT: 215 LBS | DIASTOLIC BLOOD PRESSURE: 99 MMHG

## 2025-07-12 DIAGNOSIS — T75.4XXA ELECTRICAL SHOCK OF HAND, INITIAL ENCOUNTER: Primary | ICD-10-CM

## 2025-07-12 LAB
ALBUMIN SERPL BCP-MCNC: 4.7 G/DL (ref 3.4–5)
ALP SERPL-CCNC: 65 U/L (ref 33–120)
ALT SERPL W P-5'-P-CCNC: 38 U/L (ref 10–52)
ANION GAP SERPL CALC-SCNC: 13 MMOL/L (ref 10–20)
APPEARANCE UR: CLEAR
AST SERPL W P-5'-P-CCNC: 21 U/L (ref 9–39)
BILIRUB SERPL-MCNC: 0.4 MG/DL (ref 0–1.2)
BILIRUB UR STRIP.AUTO-MCNC: NEGATIVE MG/DL
BUN SERPL-MCNC: 16 MG/DL (ref 6–23)
CALCIUM SERPL-MCNC: 9.2 MG/DL (ref 8.6–10.3)
CARDIAC TROPONIN I PNL SERPL HS: 4 NG/L (ref 0–20)
CHLORIDE SERPL-SCNC: 105 MMOL/L (ref 98–107)
CK SERPL-CCNC: 200 U/L (ref 0–325)
CO2 SERPL-SCNC: 25 MMOL/L (ref 21–32)
COLOR UR: NORMAL
CREAT SERPL-MCNC: 0.94 MG/DL (ref 0.5–1.3)
EGFRCR SERPLBLD CKD-EPI 2021: >90 ML/MIN/1.73M*2
ERYTHROCYTE [DISTWIDTH] IN BLOOD BY AUTOMATED COUNT: 13.2 % (ref 11.5–14.5)
GLUCOSE SERPL-MCNC: 86 MG/DL (ref 74–99)
GLUCOSE UR STRIP.AUTO-MCNC: NORMAL MG/DL
HCT VFR BLD AUTO: 46.6 % (ref 41–52)
HGB BLD-MCNC: 15.6 G/DL (ref 13.5–17.5)
KETONES UR STRIP.AUTO-MCNC: NEGATIVE MG/DL
LACTATE SERPL-SCNC: 1 MMOL/L (ref 0.4–2)
LEUKOCYTE ESTERASE UR QL STRIP.AUTO: NEGATIVE
MCH RBC QN AUTO: 28.7 PG (ref 26–34)
MCHC RBC AUTO-ENTMCNC: 33.5 G/DL (ref 32–36)
MCV RBC AUTO: 86 FL (ref 80–100)
NITRITE UR QL STRIP.AUTO: NEGATIVE
NRBC BLD-RTO: 0 /100 WBCS (ref 0–0)
PH UR STRIP.AUTO: 6.5 [PH]
PLATELET # BLD AUTO: 369 X10*3/UL (ref 150–450)
POTASSIUM SERPL-SCNC: 4 MMOL/L (ref 3.5–5.3)
PROT SERPL-MCNC: 7.7 G/DL (ref 6.4–8.2)
PROT UR STRIP.AUTO-MCNC: NEGATIVE MG/DL
RBC # BLD AUTO: 5.44 X10*6/UL (ref 4.5–5.9)
RBC # UR STRIP.AUTO: NEGATIVE MG/DL
SODIUM SERPL-SCNC: 139 MMOL/L (ref 136–145)
SP GR UR STRIP.AUTO: 1.02
UROBILINOGEN UR STRIP.AUTO-MCNC: NORMAL MG/DL
WBC # BLD AUTO: 9.2 X10*3/UL (ref 4.4–11.3)

## 2025-07-12 PROCEDURE — 93005 ELECTROCARDIOGRAM TRACING: CPT

## 2025-07-12 PROCEDURE — 84484 ASSAY OF TROPONIN QUANT: CPT | Performed by: STUDENT IN AN ORGANIZED HEALTH CARE EDUCATION/TRAINING PROGRAM

## 2025-07-12 PROCEDURE — 85027 COMPLETE CBC AUTOMATED: CPT | Performed by: STUDENT IN AN ORGANIZED HEALTH CARE EDUCATION/TRAINING PROGRAM

## 2025-07-12 PROCEDURE — 2500000005 HC RX 250 GENERAL PHARMACY W/O HCPCS: Performed by: STUDENT IN AN ORGANIZED HEALTH CARE EDUCATION/TRAINING PROGRAM

## 2025-07-12 PROCEDURE — 36415 COLL VENOUS BLD VENIPUNCTURE: CPT | Performed by: STUDENT IN AN ORGANIZED HEALTH CARE EDUCATION/TRAINING PROGRAM

## 2025-07-12 PROCEDURE — 83605 ASSAY OF LACTIC ACID: CPT | Performed by: STUDENT IN AN ORGANIZED HEALTH CARE EDUCATION/TRAINING PROGRAM

## 2025-07-12 PROCEDURE — 81003 URINALYSIS AUTO W/O SCOPE: CPT | Performed by: STUDENT IN AN ORGANIZED HEALTH CARE EDUCATION/TRAINING PROGRAM

## 2025-07-12 PROCEDURE — 82550 ASSAY OF CK (CPK): CPT | Performed by: STUDENT IN AN ORGANIZED HEALTH CARE EDUCATION/TRAINING PROGRAM

## 2025-07-12 PROCEDURE — 99284 EMERGENCY DEPT VISIT MOD MDM: CPT | Performed by: STUDENT IN AN ORGANIZED HEALTH CARE EDUCATION/TRAINING PROGRAM

## 2025-07-12 PROCEDURE — 80053 COMPREHEN METABOLIC PANEL: CPT | Performed by: STUDENT IN AN ORGANIZED HEALTH CARE EDUCATION/TRAINING PROGRAM

## 2025-07-12 RX ORDER — ERYTHROMYCIN 5 MG/G
1 OINTMENT OPHTHALMIC ONCE
Status: COMPLETED | OUTPATIENT
Start: 2025-07-12 | End: 2025-07-12

## 2025-07-12 RX ORDER — ERYTHROMYCIN 5 MG/G
OINTMENT OPHTHALMIC EVERY 6 HOURS
Qty: 1 G | Refills: 0 | Status: SHIPPED | OUTPATIENT
Start: 2025-07-12 | End: 2025-07-15

## 2025-07-12 RX ADMIN — ERYTHROMYCIN 1 CM: 5 OINTMENT OPHTHALMIC at 20:01

## 2025-07-12 ASSESSMENT — VISUAL ACUITY
OU: 20/20
OU: 20/20
OS: 20/20
OD: 20/20
OD: 20/20
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
## 2025-07-12 NOTE — Clinical Note
Luis A Camacho was seen and treated in our emergency department on 7/12/2025.  He may return to work on 07/13/2025.       If you have any questions or concerns, please don't hesitate to call.      Adam Rodríguez MD
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
## 2025-07-14 LAB
ATRIAL RATE: 108 BPM
P AXIS: 57 DEGREES
P OFFSET: 203 MS
P ONSET: 156 MS
PR INTERVAL: 130 MS
Q ONSET: 221 MS
QRS COUNT: 18 BEATS
QRS DURATION: 86 MS
QT INTERVAL: 328 MS
QTC CALCULATION(BAZETT): 439 MS
QTC FREDERICIA: 399 MS
R AXIS: 16 DEGREES
T AXIS: 40 DEGREES
T OFFSET: 385 MS
VENTRICULAR RATE: 108 BPM
